# Patient Record
(demographics unavailable — no encounter records)

---

## 2025-07-20 NOTE — HISTORY OF PRESENT ILLNESS
[FreeTextEntry1] : Yaniv is a 5 month old ex 34 weeker here today for follow up for a fetal lung lesion.  He had a CT scan earliler this week and they are here to discuss the results.  Mom and dad say Yaniv has been doing well since his last visit.  They deny any respiratory symptoms.  He is growing and gaining weight.  He is tolerating his feeds well and has not had any fevers.

## 2025-07-20 NOTE — REASON FOR VISIT
[Follow-up - Scheduled] : a follow-up, scheduled visit for [Other: ____] : [unfilled] [FreeTextEntry4] : Dr Jacobo Adam [Patient] : patient [Parents] : parents